# Patient Record
(demographics unavailable — no encounter records)

---

## 2025-01-21 NOTE — REVIEW OF SYSTEMS
[Feeling Fatigued] : feeling fatigued [Joint Pain] : joint pain [Joint Stiffness] : joint stiffness [Negative] : Gastrointestinal [FreeTextEntry5] : see HPI [de-identified] : see HPI [de-identified] : see HPI

## 2025-01-21 NOTE — CARDIOLOGY SUMMARY
[de-identified] : 1/21/2025, NSR with baseline artifact 2/6/2024, NSR with baseline artifact 8/17/2022, NSR with baseline artifact [de-identified] : 9/12/2022, Plain Treadmill Stress Test: Exercised for 3 minutes and 52 seconds utilizing Standard Levi Protocol. No chest pain or abnormal dyspnea. No ECG changes to suggest ischemia.\par   [de-identified] : 2/28/2024, LV EF 5-60%, normal LV diastolic function, mild MR, mild TR with estimated PASP of 32mmHg 9/12/2022, LV EF 55-60%, trace MR, trace AI, normal LV diastolic function, mild TR with estimated PASP of 28mmHg.

## 2025-01-21 NOTE — PHYSICAL EXAM
[Normal] : no edema, no cyanosis, no clubbing, no varicosities [de-identified] : No carotid bruits auscultated bilaterally [de-identified] : right arm resting tremor

## 2025-01-21 NOTE — DISCUSSION/SUMMARY
[FreeTextEntry1] : 1. Chest Pain: resolved. No ischemia noted on ETT from 9/2022 and normal LV systolic function on echocardiogram from 2/2024.  2. HLD: continue rosuvastatin 10mg daily.  3. Aortic Valve Insufficiency: trace on echocardiogram from 9/2022. Periodic echo surveillance.   Follow up in 12 months. [EKG obtained to assist in diagnosis and management of assessed problem(s)] : EKG obtained to assist in diagnosis and management of assessed problem(s)

## 2025-01-21 NOTE — HISTORY OF PRESENT ILLNESS
[FreeTextEntry1] : Historical Perspective: 71 year old female with PMHx of Parkinson disease (currently with right arm resting tremor), right breast cancer in 2018 treated with lumpectomy and radiation, HLD presents for a cardiac evaluation because of an episode of central chest tightness about two weeks ago. Patient is under a lot of stress. Daughter is currently going through a divorce. The chest tightness occurred during an argument. Lasted a few seconds. Resolved spontaneously. Did not reoccur.   There is no history of MI, CVA, CHF, or previous coronary intervention.  Current Health Status: Patient with no chest pain, SOB, or palpitations. No hospitalizations since seeing me last. Remains compliant with his medications and reports no adverse effects.

## 2025-07-23 NOTE — DISCUSSION/SUMMARY
[FreeTextEntry1] : 71yo with POP, OAB and vaginal atrophy. PVR 180cc and neg CST.  1. POP -The patient has pelvic organ prolapse. Management options including observation, kegels with or without PFPT, pessary, and surgery were reviewed. Pessary care was reviewed. Surg options discussed. -Will return for pessary fitting with PA.  2. OAB -The patient has urinary symptoms consistent with overactive bladder. The etiology of OAB was discussed. Management options including observation, behavioral modifications (dietary changes, monitoring fluid intake, bladder training, timed voids), PFPT, medications, PTNS, SNS, and bladder Botox were all reviewed. -UA/Ucx  -Will start with bladder diet and behavioral modifications  -Will consider further treatment if PVRs improve with pessary  3. Vaginal atrophy -We discussed that vaginal estrogen cream is not hormone replacement therapy and is very minimally systemically absorbed. We discussed its benefits in terms of improving vaginal tissue quality, intercourse, urinary symptoms and decreased UTIs. Patient will begin to use 3x/week.

## 2025-07-23 NOTE — HISTORY OF PRESENT ILLNESS
[FreeTextEntry1] : 71yo with a vaginal bulge for the past few months but GYN told her about POP 10 years ago. Has UUI and urinary urgency for the past 10 years and it is exacerbating.   PMH: PD, aortic valve insufficiency, HLD, breast CA (rads, anastrozole) PSH: lumpectomy, cataract, meniscus surgery    Day time voids: 5-6 Nighttime voids: 3 GABINO: rare UUI: daily Urinary urgency: yes Bladder irritants: tea, seltzer, tomatoes, chocolate, citrus  BM: constipation - Miralax   Fecal Incontinence: no Vaginal bulge: yes, no splinting Prior treatment: no  Voiding dysfunction: no incomplete bladder emptying Lower urinary tract/vaginal symptoms: no UTIs in past year, no hematuria  Sexually active: yes, no pain LMP: 50s, no VB Pap smears: 1 year, normal  Labs and chart reviewed: yes

## 2025-07-23 NOTE — PHYSICAL EXAM
[Chaperoned Physical Exam] : A chaperone was present in the examining room during all aspects of the physical examination. [MA] : MA [No Acute Distress] : in no acute distress [Well developed] : well developed [Oriented x3] : oriented to person, place, and time [Normal Memory] : ~T memory was ~L unimpaired [Warm and Dry] : was warm and dry to touch [Normal Gait] : gait was normal [Labia Majora] : were normal [Labia Minora] : were normal [Normal] : no abnormalities [Post Void Residual ____ml] : post void residual was [unfilled] ml [Exam Deferred] : was deferred [Atrophy] : atrophy [Aa ____] : Aa [unfilled] [Ba ____] : Ba [unfilled] [C ____] : C [unfilled] [GH ____] : GH [unfilled] [PB ____] : PB [unfilled] [TVL ____] : TVL  [unfilled] [Ap ____] : Ap [unfilled] [Bp ____] : Bp [unfilled] [D ____] : D [unfilled] [FreeTextEntry2] : Alanna [Cough] : no cough [FreeTextEntry3] : negative CST

## 2025-07-23 NOTE — REASON FOR VISIT
[Initial Visit ___] : an initial visit for [unfilled] [Urinary Incontinence] : urinary incontinence [Urinary Urgency] : urinary urgency [Pelvic Organ Prolapse] : pelvic organ prolapse